# Patient Record
Sex: MALE | Race: WHITE | URBAN - METROPOLITAN AREA
[De-identification: names, ages, dates, MRNs, and addresses within clinical notes are randomized per-mention and may not be internally consistent; named-entity substitution may affect disease eponyms.]

---

## 2021-08-06 ENCOUNTER — OFFICE VISIT (OUTPATIENT)
Dept: ORTHOPEDIC SURGERY | Age: 71
End: 2021-08-06
Payer: COMMERCIAL

## 2021-08-06 DIAGNOSIS — M25.552 LEFT HIP PAIN: Primary | ICD-10-CM

## 2021-08-06 DIAGNOSIS — M47.816 LUMBAR SPONDYLOSIS: ICD-10-CM

## 2021-08-06 DIAGNOSIS — M16.12 PRIMARY OSTEOARTHRITIS OF LEFT HIP: ICD-10-CM

## 2021-08-06 PROCEDURE — G8427 DOCREV CUR MEDS BY ELIG CLIN: HCPCS | Performed by: ORTHOPAEDIC SURGERY

## 2021-08-06 PROCEDURE — 20611 DRAIN/INJ JOINT/BURSA W/US: CPT | Performed by: ORTHOPAEDIC SURGERY

## 2021-08-06 PROCEDURE — 1036F TOBACCO NON-USER: CPT | Performed by: ORTHOPAEDIC SURGERY

## 2021-08-06 PROCEDURE — 4040F PNEUMOC VAC/ADMIN/RCVD: CPT | Performed by: ORTHOPAEDIC SURGERY

## 2021-08-06 PROCEDURE — 99204 OFFICE O/P NEW MOD 45 MIN: CPT | Performed by: ORTHOPAEDIC SURGERY

## 2021-08-06 PROCEDURE — 1123F ACP DISCUSS/DSCN MKR DOCD: CPT | Performed by: ORTHOPAEDIC SURGERY

## 2021-08-06 PROCEDURE — G8421 BMI NOT CALCULATED: HCPCS | Performed by: ORTHOPAEDIC SURGERY

## 2021-08-06 PROCEDURE — 3017F COLORECTAL CA SCREEN DOC REV: CPT | Performed by: ORTHOPAEDIC SURGERY

## 2021-08-06 RX ORDER — BUPIVACAINE HYDROCHLORIDE 2.5 MG/ML
5 INJECTION, SOLUTION INFILTRATION; PERINEURAL ONCE
Status: COMPLETED | OUTPATIENT
Start: 2021-08-06 | End: 2021-08-06

## 2021-08-06 RX ORDER — LIDOCAINE HYDROCHLORIDE 10 MG/ML
20 INJECTION, SOLUTION INFILTRATION; PERINEURAL ONCE
Status: COMPLETED | OUTPATIENT
Start: 2021-08-06 | End: 2021-08-06

## 2021-08-06 RX ADMIN — BUPIVACAINE HYDROCHLORIDE 5 MG: 2.5 INJECTION, SOLUTION INFILTRATION; PERINEURAL at 11:14

## 2021-08-06 RX ADMIN — LIDOCAINE HYDROCHLORIDE 20 MG: 10 INJECTION, SOLUTION INFILTRATION; PERINEURAL at 11:14

## 2021-08-06 NOTE — PROGRESS NOTES
to visit. No current facility-administered medications on file prior to visit. ASCVD 10-YEAR RISK SCORE  The ASCVD Risk score (Dimitri Morales, et al., 2013) failed to calculate for the following reasons: The systolic blood pressure is missing    Cannot find a previous HDL lab    Cannot find a previous total cholesterol lab   . Review of Systems  10-point ROS is negative other than HPI. Physical Exam  Based off 1997 Exam Criteria  There were no vitals taken for this visit. Constitutional:       General: He is not in acute distress. Appearance: Normal appearance. Cardiovascular:      Rate and Rhythm: Normal rate and regular rhythm. Pulses: Normal pulses. Pulmonary:      Effort: Pulmonary effort is normal. No respiratory distress. Neurological:      Mental Status: He is alert and oriented to person, place, and time. Mental status is at baseline.      Musculoskeletal:  Gait:  antalgic    Spine / Hip Exam:      RIGHT  LEFT    Lumbar Spine Exam  [] All Neg    [] All Neg     Straight leg raise  []  []Not tested   []  []Not tested    Clonus  []  []Not tested   []  []Not tested    Pain with motion  []  []Not tested   []  []Not tested    Radiculopathy  []  []Not tested   []  []Not tested    Paraspinal muscle tenderness  [x] Paraspinal  []Midline   [x] Paraspinal  []Midline   Sensation RIGHT  LEFT    L3  [x] Normal []Decreased    [x] Normal []Decreased   L4  [x] Normal  []Decreased   [x] Normal []Decreased   L5  [x] Normal []Decreased   [x] Normal []Decreased   S1  [x] Normal  []Decreased   [x] Normal []Decreased   Pelvis       Scoliosis  [x] Nml  [] Present     Leg-length discrepency  [x] Equal  [] Right longer   [] Left longer   Range of Motion Active Passive Active Passive   Hip Flexion 100  80    Abduction 50  50    External Rotation @ 90 flex 65  45    Internal Rotation @ 90 flex 10  0           Hip Impingement / Dysplasia  [] All Neg  [] Not tested   [] All Neg  [] Not tested    Hip impingement test  []  []Not tested   [x]  []Not tested    C-sign  []  []Not tested   [x]  []Not tested    Anterior instability apprehension  []  []Not tested   []  []Not tested    Posterior instability apprehension  []  []Not tested   []  []Not tested    Uncontained Internal rotation  []  []Not tested  []  []Not tested          Abductors  [] All Neg  [] Not tested   [x] All Neg  [] Not tested    Medius strength  []  []Not tested   []  []Not tested    Minimum strength  []  []Not tested   []  []Not tested    IT band tendonitis  []  []Not tested   []  []Not tested    Trochanteric tenderness  []  []Not tested  []  []Not tested   Sciatic neuropathic pain  []  []Not tested   []  []Not tested           Post-arthroplasty  [] All Neg  [] Not tested   [] All Neg  [] Not tested    Rectus tendonitis  []  []Not tested   []  []Not tested    Iliopsoas tendonitis       Start-up pain  []  []Not tested   []  []Not tested      Markedly positive Stinchfield sign and impingement signs. No tenderness along the trochanter. Equivocal straight leg raise sign. Imaging    Left Hip: [facility] [place]  Radiographs: End-stage osteoarthritis as a result of hip impingement with large cam lesion, acetabular cyst, complete joint space narrowing and sclerosis  MRI: MRI reviewed, does show a para labral cyst posteriorly, also 1 that traverses further posterior along and into the fascia of the piriformis muscle. The cyst appeared to be on the other side of the piriformis muscle, and therefore away from the sciatic nerve where it would be traversing into the sciatic notch. The cyst themselves do not appear big enough to me to cause compression at this location. Assessment and Plan  Kimberly was seen today for hip pain. Diagnoses and all orders for this visit:    Left hip pain  -     XR HIP LEFT (2-3 VIEWS); Future  -     US ARTHR/ASP/INJ MAJOR JNT/BURSA LEFT;  Future    Primary osteoarthritis of left hip  -     US ARTHR/ASP/INJ MAJOR JNT/BURSA LEFT; Future    Lumbar spondylosis    Other orders  -     bupivacaine (MARCAINE) 0.25 % injection 5 mg  -     lidocaine 1 % injection 20 mg       I discussed with Thao Neri that his history, symptoms, signs and imaging are most consistent with femoro-acetabular impingement, hip arthritis and Lumbar spondylosis, para labral cyst of the hip    We reviewed the natural history of these conditions and treatment options ranging from conservative measures (rest, icing, activity modification, physical therapy, pain meds, cortisone injection) to surgical options. In terms of treatment, I recommended continuing with rest, icing, avoidance of painful activities, NSAIDs or pain meds as tolerated, and home therapy. He has been doing lots of modalities to try and help with his pain    Left Hip Local Injection - Ultrasound-guided CPT: 80331   Consent was obtained after discussion of the risks, benefits, alternatives, including, but not limited to bleeding, pain, infection, skin disruption or discoloration. Laterality was confirmed (timeout). The hip was prepped with alcohol.  Deep ultrasound was used to visualize the femoral head, neck, and acetabular rim. 22-gauge spinal needle was used. I confirmed transducer orientation along the axis of the femoral neck. [employer] ultrasound unit was used with a variable frequency (6.0-15.0 MHz) linear transducer. Ultrasound-guided needle localization to the hip joint was performed. Confirmation of needle placement was performed, and the image was stored  A formulation of 2cc of 1% lidocaine, 2cc of 0.25% sensoricaine was injected into the hip. Appropriate insufflation deep to the hip capsule was visualized. The site was cleaned and dressed with a band aid.  Transducer was aligned with femoral neck. Visualization of the femoral head and neck region was obtained. Visualization of the needle within the joint capsule was visualized.   Images were taken and saved for permanent record.  He tolerated this well and there were no complications. Near 100% of her pain was relieved after injection. We discussed surgical options as well, should conservative measures fail. I extended discussion 2 nights ago as well as today. I have coordinated his care with Dr. Spike Horvath and Dr. Anais Lucia. We discussed the option for a hip replacement if his pain improves with his local injection. He will get back to us in the next few minutes. Talk to him afterwards. He had complete relief of his groin pain. He also had moderate relief of his buttock pain, still had some residual pain but felt like the other side. Had resolved pain going down towards his knee. I advised anterior-based hip replacement 3 months from now. Electronically signed by Love Byrd MD on 8/6/2021 at 11:44 AM  This dictation was generated by voice recognition computer software. Although all attempts are made to edit the dictation for accuracy, there may be errors in the transcription that are not intended.

## 2024-11-19 ENCOUNTER — HOSPITAL ENCOUNTER (EMERGENCY)
Age: 74
Discharge: HOME | End: 2024-11-20
Attending: EMERGENCY MEDICINE
Payer: COMMERCIAL

## 2024-11-19 ENCOUNTER — APPOINTMENT (OUTPATIENT)
Dept: CT IMAGING | Age: 74
End: 2024-11-19
Payer: COMMERCIAL

## 2024-11-19 ENCOUNTER — APPOINTMENT (OUTPATIENT)
Dept: GENERAL RADIOLOGY | Age: 74
End: 2024-11-19
Payer: COMMERCIAL

## 2024-11-19 DIAGNOSIS — R53.83 OTHER FATIGUE: Primary | ICD-10-CM

## 2024-11-19 DIAGNOSIS — R58 RETROPERITONEAL BLEED: ICD-10-CM

## 2024-11-19 LAB
ALBUMIN SERPL-MCNC: 3.7 G/DL (ref 3.4–5)
ALBUMIN/GLOB SERPL: 1.5 {RATIO} (ref 1.1–2.2)
ALP SERPL-CCNC: 73 U/L (ref 40–129)
ALT SERPL-CCNC: 22 U/L (ref 10–40)
AMORPH SED URNS QL MICRO: ABNORMAL /HPF
ANION GAP SERPL CALCULATED.3IONS-SCNC: 12 MMOL/L (ref 3–16)
AST SERPL-CCNC: 26 U/L (ref 15–37)
BACTERIA URNS QL MICRO: ABNORMAL /HPF
BASOPHILS # BLD: 0 K/UL (ref 0–0.2)
BASOPHILS NFR BLD: 0.2 %
BILIRUB SERPL-MCNC: 1.8 MG/DL (ref 0–1)
BILIRUB UR QL STRIP.AUTO: NEGATIVE
BUN SERPL-MCNC: 17 MG/DL (ref 7–20)
CALCIUM SERPL-MCNC: 8.5 MG/DL (ref 8.3–10.6)
CHLORIDE SERPL-SCNC: 105 MMOL/L (ref 99–110)
CLARITY UR: CLEAR
CO2 SERPL-SCNC: 18 MMOL/L (ref 21–32)
COLOR UR: YELLOW
CREAT SERPL-MCNC: 1.4 MG/DL (ref 0.8–1.3)
DEPRECATED RDW RBC AUTO: 14.3 % (ref 12.4–15.4)
EOSINOPHIL # BLD: 0 K/UL (ref 0–0.6)
EOSINOPHIL NFR BLD: 0.6 %
FLUAV RNA RESP QL NAA+PROBE: NOT DETECTED
FLUBV RNA RESP QL NAA+PROBE: NOT DETECTED
GFR SERPLBLD CREATININE-BSD FMLA CKD-EPI: 52 ML/MIN/{1.73_M2}
GLUCOSE SERPL-MCNC: 159 MG/DL (ref 70–99)
GLUCOSE UR STRIP.AUTO-MCNC: NEGATIVE MG/DL
HCT VFR BLD AUTO: 31.9 % (ref 40.5–52.5)
HGB BLD-MCNC: 10.8 G/DL (ref 13.5–17.5)
HGB UR QL STRIP.AUTO: NEGATIVE
KETONES UR STRIP.AUTO-MCNC: NEGATIVE MG/DL
LACTATE BLDV-SCNC: 1.5 MMOL/L (ref 0.4–2)
LEUKOCYTE ESTERASE UR QL STRIP.AUTO: NEGATIVE
LYMPHOCYTES # BLD: 0.8 K/UL (ref 1–5.1)
LYMPHOCYTES NFR BLD: 10 %
MCH RBC QN AUTO: 32.3 PG (ref 26–34)
MCHC RBC AUTO-ENTMCNC: 33.9 G/DL (ref 31–36)
MCV RBC AUTO: 95.2 FL (ref 80–100)
MONOCYTES # BLD: 0.8 K/UL (ref 0–1.3)
MONOCYTES NFR BLD: 10 %
NEUTROPHILS # BLD: 6.1 K/UL (ref 1.7–7.7)
NEUTROPHILS NFR BLD: 79.2 %
NITRITE UR QL STRIP.AUTO: NEGATIVE
PH UR STRIP.AUTO: 7.5 [PH] (ref 5–8)
PLATELET # BLD AUTO: 159 K/UL (ref 135–450)
PMV BLD AUTO: 9.8 FL (ref 5–10.5)
POTASSIUM SERPL-SCNC: 3.9 MMOL/L (ref 3.5–5.1)
PROT SERPL-MCNC: 6.2 G/DL (ref 6.4–8.2)
PROT UR STRIP.AUTO-MCNC: 30 MG/DL
RBC # BLD AUTO: 3.35 M/UL (ref 4.2–5.9)
RBC #/AREA URNS HPF: ABNORMAL /HPF (ref 0–4)
SARS-COV-2 RNA RESP QL NAA+PROBE: NOT DETECTED
SODIUM SERPL-SCNC: 135 MMOL/L (ref 136–145)
SP GR UR STRIP.AUTO: 1.01 (ref 1–1.03)
UA DIPSTICK W REFLEX MICRO PNL UR: YES
URN SPEC COLLECT METH UR: ABNORMAL
UROBILINOGEN UR STRIP-ACNC: 0.2 E.U./DL
WBC # BLD AUTO: 7.7 K/UL (ref 4–11)
WBC #/AREA URNS HPF: ABNORMAL /HPF (ref 0–5)

## 2024-11-19 PROCEDURE — 2580000003 HC RX 258: Performed by: NURSE PRACTITIONER

## 2024-11-19 PROCEDURE — 85025 COMPLETE CBC W/AUTO DIFF WBC: CPT

## 2024-11-19 PROCEDURE — 87636 SARSCOV2 & INF A&B AMP PRB: CPT

## 2024-11-19 PROCEDURE — 83605 ASSAY OF LACTIC ACID: CPT

## 2024-11-19 PROCEDURE — 74177 CT ABD & PELVIS W/CONTRAST: CPT

## 2024-11-19 PROCEDURE — 71260 CT THORAX DX C+: CPT

## 2024-11-19 PROCEDURE — 81001 URINALYSIS AUTO W/SCOPE: CPT

## 2024-11-19 PROCEDURE — 99285 EMERGENCY DEPT VISIT HI MDM: CPT

## 2024-11-19 PROCEDURE — 6360000004 HC RX CONTRAST MEDICATION: Performed by: NURSE PRACTITIONER

## 2024-11-19 PROCEDURE — 71045 X-RAY EXAM CHEST 1 VIEW: CPT

## 2024-11-19 PROCEDURE — 80053 COMPREHEN METABOLIC PANEL: CPT

## 2024-11-19 RX ORDER — 0.9 % SODIUM CHLORIDE 0.9 %
1000 INTRAVENOUS SOLUTION INTRAVENOUS ONCE
Status: COMPLETED | OUTPATIENT
Start: 2024-11-19 | End: 2024-11-19

## 2024-11-19 RX ORDER — IOPAMIDOL 755 MG/ML
75 INJECTION, SOLUTION INTRAVASCULAR
Status: COMPLETED | OUTPATIENT
Start: 2024-11-19 | End: 2024-11-19

## 2024-11-19 RX ADMIN — SODIUM CHLORIDE 1000 ML: 9 INJECTION, SOLUTION INTRAVENOUS at 21:55

## 2024-11-19 RX ADMIN — IOPAMIDOL 75 ML: 755 INJECTION, SOLUTION INTRAVENOUS at 22:41

## 2024-11-19 ASSESSMENT — LIFESTYLE VARIABLES
HOW OFTEN DO YOU HAVE A DRINK CONTAINING ALCOHOL: NEVER
HOW MANY STANDARD DRINKS CONTAINING ALCOHOL DO YOU HAVE ON A TYPICAL DAY: PATIENT DOES NOT DRINK

## 2024-11-19 ASSESSMENT — PAIN - FUNCTIONAL ASSESSMENT: PAIN_FUNCTIONAL_ASSESSMENT: NONE - DENIES PAIN

## 2024-11-20 VITALS
HEIGHT: 71 IN | OXYGEN SATURATION: 95 % | SYSTOLIC BLOOD PRESSURE: 127 MMHG | WEIGHT: 204 LBS | DIASTOLIC BLOOD PRESSURE: 69 MMHG | RESPIRATION RATE: 18 BRPM | HEART RATE: 73 BPM | BODY MASS INDEX: 28.56 KG/M2 | TEMPERATURE: 100 F

## 2024-11-20 NOTE — CONSULTS
Called [facility] @ [phone]  PER:  Toni Ramos, APRN - CNP  RE:  dr. jimenez general surgery [facility]  Patient was able to get doctor on phone. Patient now being discharged and no longer need to speak on our end.

## 2024-11-20 NOTE — ED PROVIDER NOTES
embolus. CT abdomen and pelvis. 1. Retroperitoneal hemorrhage on the right. Critical results were called by Dr. Sienna Angeles to Toni Ramos on 11/19/2024 at 23:22.     XR CHEST PORTABLE    Result Date: 11/19/2024  EXAMINATION: ONE XRAY VIEW OF THE CHEST 11/19/2024 9:01 pm COMPARISON: None. HISTORY: ORDERING SYSTEM PROVIDED HISTORY: chest TECHNOLOGIST PROVIDED HISTORY: Reason for exam:->chest Reason for Exam: cp FINDINGS: The cardiac silhouette is mildly enlarged.  The remaining mediastinal contours are unremarkable.  No pneumothorax, vascular congestion, consolidation, or pleural effusion is identified.  No acute osseous abnormality identified.     1. No acute process identified. 2. Mildly enlarged cardiac silhouette.       No results found.    PROCEDURES   Unless otherwise noted below, none     Procedures    CRITICAL CARE TIME (.cctime)       PAST MEDICAL HISTORY      has no past medical history on file.     EMERGENCY DEPARTMENT COURSE and DIFFERENTIAL DIAGNOSIS/MDM:   Vitals:    Vitals:    11/19/24 2115 11/19/24 2141 11/19/24 2251 11/19/24 2353   BP: 120/75 127/73 127/69    Pulse: 80 80 74 73   Resp: 14 13 18 18   Temp:       TempSrc:       SpO2: 96% 94% 98% 95%   Weight:       Height:           Patient was given the following medications:  Medications   sodium chloride 0.9 % bolus 1,000 mL (0 mLs IntraVENous Stopped 11/19/24 2226)   iopamidol (ISOVUE-370) 76 % injection 75 mL (75 mLs IntraVENous Given 11/19/24 2241)             Is this patient to be included in the SEP-1 core measure? No   Exclusion criteria - the patient is NOT to be included for SEP-1 Core Measure due to:  2+ SIRS criteria are not met     Chronic Conditions affecting care:    has no past medical history on file.    CONSULTS: (Who and What was discussed)  IP CONSULT TO GENERAL SURGERY      Social Determinants Significantly Affecting Health : None    Records Reviewed (External and Source)     CC/HPI Summary, DDx, ED Course, and Reassessment:

## 2025-04-30 ENCOUNTER — OFFICE VISIT (OUTPATIENT)
Dept: ORTHOPEDIC SURGERY | Age: 75
End: 2025-04-30
Payer: COMMERCIAL

## 2025-04-30 VITALS — HEIGHT: 70 IN | WEIGHT: 199 LBS | BODY MASS INDEX: 28.49 KG/M2

## 2025-04-30 DIAGNOSIS — M47.812 CERVICAL SPONDYLOSIS: ICD-10-CM

## 2025-04-30 DIAGNOSIS — M47.812 OSTEOARTHRITIS OF CERVICAL SPINE, UNSPECIFIED SPINAL OSTEOARTHRITIS COMPLICATION STATUS: ICD-10-CM

## 2025-04-30 DIAGNOSIS — M12.812 ROTATOR CUFF ARTHROPATHY OF LEFT SHOULDER: Primary | ICD-10-CM

## 2025-04-30 DIAGNOSIS — M1A.0190 IDIOPATHIC CHRONIC GOUT OF SHOULDER WITHOUT TOPHUS, UNSPECIFIED LATERALITY: ICD-10-CM

## 2025-04-30 PROCEDURE — 99204 OFFICE O/P NEW MOD 45 MIN: CPT | Performed by: ORTHOPAEDIC SURGERY

## 2025-04-30 PROCEDURE — G8419 CALC BMI OUT NRM PARAM NOF/U: HCPCS | Performed by: ORTHOPAEDIC SURGERY

## 2025-04-30 PROCEDURE — 1036F TOBACCO NON-USER: CPT | Performed by: ORTHOPAEDIC SURGERY

## 2025-04-30 PROCEDURE — 1123F ACP DISCUSS/DSCN MKR DOCD: CPT | Performed by: ORTHOPAEDIC SURGERY

## 2025-04-30 PROCEDURE — G8427 DOCREV CUR MEDS BY ELIG CLIN: HCPCS | Performed by: ORTHOPAEDIC SURGERY

## 2025-04-30 RX ORDER — BENZONATATE 100 MG/1
CAPSULE ORAL
COMMUNITY

## 2025-04-30 RX ORDER — GABAPENTIN 300 MG/1
CAPSULE ORAL
COMMUNITY

## 2025-04-30 RX ORDER — NIACIN 500 MG/1
500 TABLET ORAL
COMMUNITY

## 2025-04-30 RX ORDER — TADALAFIL 5 MG/1
TABLET ORAL
COMMUNITY
Start: 2024-09-16

## 2025-04-30 RX ORDER — RAMIPRIL 2.5 MG/1
CAPSULE ORAL
COMMUNITY

## 2025-04-30 RX ORDER — COLCHICINE 0.6 MG/1
1 TABLET ORAL DAILY
COMMUNITY

## 2025-04-30 RX ORDER — ASPIRIN 325 MG
325 TABLET ORAL DAILY
COMMUNITY

## 2025-04-30 RX ORDER — EZETIMIBE 10 MG/1
1 TABLET ORAL DAILY
COMMUNITY
Start: 2024-06-14

## 2025-04-30 RX ORDER — AMOXICILLIN 875 MG/1
1 TABLET, COATED ORAL 2 TIMES DAILY
COMMUNITY

## 2025-04-30 RX ORDER — ALBUTEROL SULFATE 90 UG/1
INHALANT RESPIRATORY (INHALATION)
COMMUNITY
Start: 2024-08-11

## 2025-04-30 RX ORDER — CLOPIDOGREL BISULFATE 75 MG/1
1 TABLET ORAL DAILY
COMMUNITY
Start: 2024-06-14

## 2025-04-30 RX ORDER — SENNOSIDES A AND B 8.6 MG/1
TABLET, FILM COATED ORAL
COMMUNITY

## 2025-04-30 RX ORDER — NITROGLYCERIN 0.4 MG/1
0.4 TABLET SUBLINGUAL EVERY 5 MIN PRN
COMMUNITY
Start: 2024-10-31

## 2025-04-30 RX ORDER — CARISOPRODOL 350 MG/1
TABLET ORAL
COMMUNITY
Start: 2024-01-05

## 2025-04-30 RX ORDER — FEBUXOSTAT 40 MG/1
1 TABLET, FILM COATED ORAL DAILY
COMMUNITY
Start: 2024-06-14

## 2025-04-30 RX ORDER — CYCLOBENZAPRINE HCL 10 MG
TABLET ORAL
COMMUNITY

## 2025-04-30 RX ORDER — MECLIZINE HYDROCHLORIDE 25 MG/1
1 TABLET ORAL EVERY 6 HOURS PRN
COMMUNITY

## 2025-04-30 RX ORDER — FEXOFENADINE HCL AND PSEUDOEPHEDRINE HCI 60; 120 MG/1; MG/1
1 TABLET, EXTENDED RELEASE ORAL 2 TIMES DAILY
COMMUNITY

## 2025-04-30 RX ORDER — ATORVASTATIN CALCIUM 80 MG/1
80 TABLET, FILM COATED ORAL DAILY
COMMUNITY

## 2025-04-30 RX ORDER — OXYCODONE AND ACETAMINOPHEN 5; 325 MG/1; MG/1
TABLET ORAL
COMMUNITY

## 2025-04-30 RX ORDER — AMOXICILLIN 500 MG
CAPSULE ORAL
COMMUNITY

## 2025-04-30 RX ORDER — ECHINACEA PURPUREA EXTRACT 125 MG
1 TABLET ORAL 3 TIMES DAILY
COMMUNITY

## 2025-04-30 NOTE — PROGRESS NOTES
11/19/2024 10.8 (L)     Hematocrit 11/19/2024 31.9 (L)     MCV 11/19/2024 95.2     MCH 11/19/2024 32.3     MCHC 11/19/2024 33.9     RDW 11/19/2024 14.3     Platelets 11/19/2024 159     MPV 11/19/2024 9.8     Neutrophils % 11/19/2024 79.2     Lymphocytes % 11/19/2024 10.0     Monocytes % 11/19/2024 10.0     Eosinophils % 11/19/2024 0.6     Basophils % 11/19/2024 0.2     Neutrophils Absolute 11/19/2024 6.1     Lymphocytes Absolute 11/19/2024 0.8 (L)     Monocytes Absolute 11/19/2024 0.8     Eosinophils Absolute 11/19/2024 0.0     Basophils Absolute 11/19/2024 0.0     Sodium 11/19/2024 135 (L)     Potassium 11/19/2024 3.9     Chloride 11/19/2024 105     CO2 11/19/2024 18 (L)     Anion Gap 11/19/2024 12     Glucose 11/19/2024 159 (H)     BUN 11/19/2024 17     Creatinine 11/19/2024 1.4 (H)     Est, Glom Filt Rate 11/19/2024 52 (A)     Calcium 11/19/2024 8.5     Total Protein 11/19/2024 6.2 (L)     Albumin 11/19/2024 3.7     Albumin/Globulin Ratio 11/19/2024 1.5     Total Bilirubin 11/19/2024 1.8 (H)     Alkaline Phosphatase 11/19/2024 73     ALT 11/19/2024 22     AST 11/19/2024 26     Lactic Acid 11/19/2024 1.5     Color, UA 11/19/2024 Yellow     Clarity, UA 11/19/2024 Clear     Glucose, Ur 11/19/2024 Negative     Bilirubin, Urine 11/19/2024 Negative     Ketones, Urine 11/19/2024 Negative     Specific Gravity, UA 11/19/2024 1.015     Blood, Urine 11/19/2024 Negative     pH, Urine 11/19/2024 7.5     Protein, UA 11/19/2024 30 (A)     Urobilinogen, Urine 11/19/2024 0.2     Nitrite, Urine 11/19/2024 Negative     Leukocyte Esterase, Urine 11/19/2024 Negative     Microscopic Examination 11/19/2024 YES     Urine Type 11/19/2024 NotGiven     WBC, UA 11/19/2024 0-2     RBC, UA 11/19/2024 None seen     Bacteria, UA 11/19/2024 1+ (A)     Amorphous, UA 11/19/2024 Rare     SARS-CoV-2 RNA, RT PCR 11/19/2024 NOT DETECTED     Influenza A 11/19/2024 NOT DETECTED     Influenza B 11/19/2024 NOT DETECTED       No results found for this or